# Patient Record
Sex: FEMALE | Race: WHITE | NOT HISPANIC OR LATINO | URBAN - METROPOLITAN AREA
[De-identification: names, ages, dates, MRNs, and addresses within clinical notes are randomized per-mention and may not be internally consistent; named-entity substitution may affect disease eponyms.]

---

## 2024-06-06 ENCOUNTER — ULTRASOUND (OUTPATIENT)
Dept: OBGYN CLINIC | Facility: CLINIC | Age: 29
End: 2024-06-06
Payer: COMMERCIAL

## 2024-06-06 VITALS
BODY MASS INDEX: 31.55 KG/M2 | DIASTOLIC BLOOD PRESSURE: 70 MMHG | SYSTOLIC BLOOD PRESSURE: 126 MMHG | HEIGHT: 67 IN | WEIGHT: 201 LBS

## 2024-06-06 DIAGNOSIS — N91.1 SECONDARY AMENORRHEA: Primary | ICD-10-CM

## 2024-06-06 PROCEDURE — 76817 TRANSVAGINAL US OBSTETRIC: CPT | Performed by: PHYSICIAN ASSISTANT

## 2024-06-06 PROCEDURE — 99213 OFFICE O/P EST LOW 20 MIN: CPT | Performed by: PHYSICIAN ASSISTANT

## 2024-06-06 NOTE — PROGRESS NOTES
Patient here for early US.  LMP: 3/5/24  She was on the Nuvaring and antibiotics; also took plan B earlier in February and beginning of March  13w/2d by LMP  AUGUSTUS: 12/10/24    Planned: no  Periods: regular with BC  Symptoms: round ligament discomfort, fatigue and nausea  She is by herself

## 2024-06-06 NOTE — PROGRESS NOTES
"ASSESSMENT/PLAN    Early pregnancy at 13w2d with a calculated AUGUSTUS of 12/10/24 based on ultrasound    - Genetic screening options reviewed. She is interested in NIPT, so MFM referral placed  - Prenatal care reviewed  - Pregnancy precautions reviewed  - Prenatal Vitamin recommended.       RTO for OB interview and PN-1 visit    SUBJECTIVE:    Veda Epstein is a 28 y.o.  presenting today for verification of pregnancy.     Patient's last menstrual period was 2024 (approximate).    Menses - using nuvaring and plan B at time of conception   This pregnancy was unplanned    She is accompanied by herself.     Reports  round ligament discomfort, fatigue and nausea   Denies bleeding     OB hx significant for:   Term    1 SAB   FOB is SMA carrier. She reports negative testing in last pregnancy.     Taking a prenatal vitamin.     The following portions of the patient's history were reviewed and updated as appropriate: allergies, current medications, past family history, past medical history, past social history, past surgical history, and problem list.    OBJECTIVE:    /70   Ht 5' 7\" (1.702 m)   Wt 91.2 kg (201 lb)   LMP 2024 (Approximate)   BMI 31.48 kg/m²     FINDINGS:  See imaging report for details  Single IUP at 13w2d     FHR: 147    Additional Findings: none     Assigning a Final AUGUSTUS  Please choose how you are assigning the AUGUSTUS: The LMP is unknown or uncertain, therefore the final AUGUSTUS will be based on today's ultrasound  Final AUGUSTUS: 12/10/2024  by ultrasound at this encounter.    Dee Gustafson PA-C    Ultrasound Probe Disinfection    A transvaginal ultrasound was performed.   Prior to use, disinfection was performed with High Level Disinfection Process ([employer])  Probe serial number [id number]:  [id number] was used    Dee Gustafson PA-C  24  3:05 PM    "

## 2024-06-07 ENCOUNTER — TELEPHONE (OUTPATIENT)
Age: 29
End: 2024-06-07